# Patient Record
Sex: MALE | Race: WHITE | NOT HISPANIC OR LATINO | Employment: OTHER | URBAN - METROPOLITAN AREA
[De-identification: names, ages, dates, MRNs, and addresses within clinical notes are randomized per-mention and may not be internally consistent; named-entity substitution may affect disease eponyms.]

---

## 2023-06-16 ENCOUNTER — HOSPITAL ENCOUNTER (EMERGENCY)
Facility: CLINIC | Age: 28
Discharge: HOME | End: 2023-06-16
Attending: EMERGENCY MEDICINE | Admitting: EMERGENCY MEDICINE
Payer: COMMERCIAL

## 2023-06-16 VITALS
BODY MASS INDEX: 24.65 KG/M2 | WEIGHT: 182 LBS | TEMPERATURE: 98 F | HEART RATE: 88 BPM | SYSTOLIC BLOOD PRESSURE: 149 MMHG | HEIGHT: 72 IN | OXYGEN SATURATION: 99 % | DIASTOLIC BLOOD PRESSURE: 88 MMHG | RESPIRATION RATE: 16 BRPM

## 2023-06-16 DIAGNOSIS — S61.012A LACERATION OF LEFT THUMB WITHOUT FOREIGN BODY WITHOUT DAMAGE TO NAIL, INITIAL ENCOUNTER: ICD-10-CM

## 2023-06-16 PROCEDURE — 12001 RPR S/N/AX/GEN/TRNK 2.5CM/<: CPT

## 2023-06-16 PROCEDURE — 99283 EMERGENCY DEPT VISIT LOW MDM: CPT

## 2023-06-16 NOTE — ED PROVIDER NOTES
History     Chief Complaint:  thumb lacertion       The history is provided by the patient.      Sixto Calderon is a right handed 28 year old male who presents with left thumb laceration. He reports he was using a  when he accidentally cut his left thumb. Patient states he bought a new house and was unpacking as well.        Independent Historian:   None - Patient Only    Review of External Notes:   I reviewed the note from 8/9/2022    Medications:    Albuterol     Past Medical History:    The patient denies any significant past medical history.     Physical Exam     Patient Vitals for the past 24 hrs:   BP Temp Temp src Pulse Resp SpO2 Height Weight   06/16/23 1503 (!) 149/88 98  F (36.7  C) Oral 88 16 99 % 1.829 m (6') 82.6 kg (182 lb)        Physical Exam  Nursing note and vitals reviewed.    Constitutional:  Appears comfortable.    Cardiovascular:  Normal rate, regular rhythm.     Radial pulse 2+.  Cap refill less than 2 seconds.  Musculoskeletal:  Range of motion normal with good extensor tendon strength No extremity deformity.       No cyanosis. No tendon or deep structure involvement.  Neurological:   Alert and oriented. Exhibits good muscle tone.       Normal sensation to finger tip.   Skin:    Skin is warm and dry. No rash noted. No bruising.     2.3 cm laceration over the left thumb extensor side over the proximal phalanx.      Emergency Department Course       Laboratory:  Labs Ordered and Resulted from Time of ED Arrival to Time of ED Departure - No data to display     Procedures     Laceration Repair      Procedure: Laceration Repair    Indication: Laceration    Consent: Verbal    Location: Left L first (thumb) finger    Length: 2.3 cm    Preparation: Irrigation with Sterile Saline.    Anesthesia/Sedation: Lidocaine - 1%      Treatment/Exploration: Wound explored, no foreign bodies found     Closure: The wound was closed with one layer. Skin/superficial layer was closed with 5 x 4-0 Nylon  using Interrupted sutures.     Patient Status: The patient tolerated the procedure well: Yes. There were no complications.      Emergency Department Course & Assessments:           Interventions:  Medications - No data to display     Assessments:  1537 I obtained history and examined the patient as noted above.   1603 I performed a left thumb laceration procedure at this time, see procedure note above.       Independent Interpretation (X-rays, CTs, rhythm strip):  None    Consultations/Discussion of Management or Tests:  None        Social Determinants of Health affecting care:   None    Disposition:  The patient was discharged to home.     Impression & Plan      Medical Decision Making:  Patient comes in with a laceration just to his extensor side of the thumb.  No deep structures are involved, tendon function and sensation are normal.  After local anesthetic, I was able to close the wound with five 4-0 Ethilon sutures.  It was dressed and the stitches out in 10 days.      Leave the bandage on until tomorrow, okay to shower, change the bandage daily and use antibiotic ointment for the first 3 to 4 days only.  Keep it covered while at work. You can leave it open to the air if you are not doing anything dirty.  Stitches out in 10 days, recheck sooner if you develop any signs of infection.    Diagnosis:    ICD-10-CM    1. Laceration of left thumb without foreign body without damage to nail, initial encounter  S61.012A            Discharge Medications:  New Prescriptions    No medications on file          Scribe Disclosure:  I, STEPHANIE VERONICA, am serving as a scribe at 3:33 PM on 6/16/2023 to document services personally performed by Vianney Burgess MD based on my observations and the provider's statements to me.     6/16/2023   Vianney Burgess MD Powell, Tracy Alan, MD  06/16/23 [id number]

## 2023-06-16 NOTE — ED TRIAGE NOTES
Pt cut his left thumb with a  today around 1430   Pt denies blood thinners   Pt states it was an accident   Pt states he is most likely not uptodate on tetanus

## 2023-06-16 NOTE — DISCHARGE INSTRUCTIONS
Leave the bandage on until tomorrow, okay to shower, change the bandage daily and use antibiotic ointment for the first 3 to 4 days only.  Keep it covered while at work. You can leave it open to the air if you are not doing anything dirty.  Stitches out in 10 days, recheck sooner if you develop any signs of infection.